# Patient Record
Sex: FEMALE | Race: BLACK OR AFRICAN AMERICAN | NOT HISPANIC OR LATINO | URBAN - METROPOLITAN AREA
[De-identification: names, ages, dates, MRNs, and addresses within clinical notes are randomized per-mention and may not be internally consistent; named-entity substitution may affect disease eponyms.]

---

## 2023-08-17 ENCOUNTER — OFFICE VISIT (OUTPATIENT)
Dept: URBAN - METROPOLITAN AREA CLINIC 52 | Facility: CLINIC | Age: 33
End: 2023-08-17

## 2023-10-12 ENCOUNTER — DASHBOARD ENCOUNTERS (OUTPATIENT)
Age: 33
End: 2023-10-12

## 2023-10-12 ENCOUNTER — OFFICE VISIT (OUTPATIENT)
Dept: URBAN - METROPOLITAN AREA CLINIC 52 | Facility: CLINIC | Age: 33
End: 2023-10-12
Payer: COMMERCIAL

## 2023-10-12 ENCOUNTER — LAB OUTSIDE AN ENCOUNTER (OUTPATIENT)
Dept: URBAN - METROPOLITAN AREA CLINIC 52 | Facility: CLINIC | Age: 33
End: 2023-10-12

## 2023-10-12 VITALS
BODY MASS INDEX: 29.09 KG/M2 | WEIGHT: 148.2 LBS | DIASTOLIC BLOOD PRESSURE: 77 MMHG | TEMPERATURE: 97.3 F | HEIGHT: 60 IN | SYSTOLIC BLOOD PRESSURE: 144 MMHG | OXYGEN SATURATION: 98 % | HEART RATE: 97 BPM

## 2023-10-12 DIAGNOSIS — K59.09 CHRONIC CONSTIPATION: ICD-10-CM

## 2023-10-12 DIAGNOSIS — R10.84 ABDOMINAL PAIN, GENERALIZED: ICD-10-CM

## 2023-10-12 PROBLEM — 14760008: Status: ACTIVE | Noted: 2023-10-12

## 2023-10-12 PROCEDURE — 99204 OFFICE O/P NEW MOD 45 MIN: CPT | Performed by: INTERNAL MEDICINE

## 2023-10-12 RX ORDER — TRIAMCINOLONE ACETONIDE 1 MG/G
APPLY 4 GRAMS OF OINTMENT TOPICALLY TWICE DAILY TO AFFECTED AREA OINTMENT TOPICAL
Qty: 60 GRAM | Refills: 0 | Status: ON HOLD | COMMUNITY

## 2023-10-12 RX ORDER — FAMOTIDINE 20 MG/1
TAKE 1 TABLET BY MOUTH TWICE DAILY TABLET, FILM COATED ORAL
Qty: 180 EACH | Refills: 0 | Status: ON HOLD | COMMUNITY

## 2023-10-12 NOTE — HPI-TODAY'S VISIT:
1 year abd pain, moderate. No radiation. Occ upper abd Occ lower abd Not nocturnal. No HB, dysphagia, melena, BRB. Chronic constipation. 3-4 BM/wk. Occ hard CT this year-NL. HBT for HP neg. Other labs-NL Uses Alleve 4 at a time for menses

## 2023-11-15 ENCOUNTER — OFFICE VISIT (OUTPATIENT)
Dept: URBAN - METROPOLITAN AREA SURGERY CENTER 17 | Facility: SURGERY CENTER | Age: 33
End: 2023-11-15